# Patient Record
(demographics unavailable — no encounter records)

---

## 2025-01-16 NOTE — PLAN
[FreeTextEntry1] : Atrophic annual examination there are decrease in size of slightly heterogeneous endometrium which is almost certainly a benign finding . Weightbearing exercise and regular physical activity is encouraged.  She is seeing her endocrinologist for thyroid nodules and I have asked her to discuss the possible need for a follow-up bone density. Repeat mammography and follow-up in 1 year Colonoscopy was done in 2023.

## 2025-01-16 NOTE — PHYSICAL EXAM
[Appropriately responsive] : appropriately responsive [Alert] : alert [No Acute Distress] : no acute distress [No Lymphadenopathy] : no lymphadenopathy [Soft] : soft [Non-tender] : non-tender [Non-distended] : non-distended [No HSM] : No HSM [No Lesions] : no lesions [No Mass] : no mass [Vulvar Atrophy] : vulvar atrophy [Atrophy] : atrophy [FreeTextEntry2] : Multiple small sebaceous cysts [FreeTextEntry6] : The uterus is atrophic.  The adnexa are not palpable [FreeTextEntry9] :  Rectal exam is normal and guaiac is negative

## 2025-01-16 NOTE — HISTORY OF PRESENT ILLNESS
[FreeTextEntry1] : Patient is here for her annual exam  She has no complaints There is a history of a heterogeneous endometrium on ultrasound and endometrial biopsy last year demonstrated strips of atrophic endometrium. She is allergic to iodine medications losartan, labetalol, folate, potassium, pantoprazole, rosuvastatin, and Ozempic. She is being followed by endocrinologist for thyroid nodules which have been biopsied and are stable and benign. She is allergic to iodine She states that mammography in December was normal